# Patient Record
Sex: MALE | Race: OTHER
[De-identification: names, ages, dates, MRNs, and addresses within clinical notes are randomized per-mention and may not be internally consistent; named-entity substitution may affect disease eponyms.]

---

## 2021-11-24 ENCOUNTER — HOSPITAL ENCOUNTER (EMERGENCY)
Dept: HOSPITAL 61 - ER | Age: 31
Discharge: HOME | End: 2021-11-24
Payer: SELF-PAY

## 2021-11-24 VITALS — DIASTOLIC BLOOD PRESSURE: 70 MMHG | SYSTOLIC BLOOD PRESSURE: 128 MMHG

## 2021-11-24 VITALS — BODY MASS INDEX: 28.69 KG/M2 | HEIGHT: 70 IN | WEIGHT: 200.4 LBS

## 2021-11-24 DIAGNOSIS — S81.811A: ICD-10-CM
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
## 2021-11-24 DIAGNOSIS — S01.01XA: ICD-10-CM

## 2021-11-24 DIAGNOSIS — S22.010A: Primary | ICD-10-CM
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
## 2021-11-24 DIAGNOSIS — R51.9: ICD-10-CM

## 2021-11-24 DIAGNOSIS — M54.2: ICD-10-CM

## 2021-11-24 LAB
ALBUMIN SERPL-MCNC: 4.1 G/DL (ref 3.4–5)
ALBUMIN/GLOB SERPL: 1.3 {RATIO} (ref 1–1.7)
ALP SERPL-CCNC: 106 U/L (ref 46–116)
ALT SERPL-CCNC: 58 U/L (ref 16–63)
ANION GAP SERPL CALC-SCNC: 8 MMOL/L (ref 6–14)
APTT BLD: 28 SEC (ref 24–38)
AST SERPL-CCNC: 32 U/L (ref 15–37)
BASOPHILS # BLD AUTO: 0.1 X10^3/UL (ref 0–0.2)
BASOPHILS NFR BLD: 1 % (ref 0–3)
BILIRUB SERPL-MCNC: 0.4 MG/DL (ref 0.2–1)
BUN SERPL-MCNC: 21 MG/DL (ref 8–26)
BUN/CREAT SERPL: 21 (ref 6–20)
CALCIUM SERPL-MCNC: 8.3 MG/DL (ref 8.5–10.1)
CHLORIDE SERPL-SCNC: 103 MMOL/L (ref 98–107)
CO2 SERPL-SCNC: 26 MMOL/L (ref 21–32)
CREAT SERPL-MCNC: 1 MG/DL (ref 0.7–1.3)
EOSINOPHIL NFR BLD: 0.2 X10^3/UL (ref 0–0.7)
EOSINOPHIL NFR BLD: 2 % (ref 0–3)
ERYTHROCYTE [DISTWIDTH] IN BLOOD BY AUTOMATED COUNT: 13.3 % (ref 11.5–14.5)
GFR SERPLBLD BASED ON 1.73 SQ M-ARVRAT: 86.1 ML/MIN
GLUCOSE SERPL-MCNC: 108 MG/DL (ref 70–99)
HCT VFR BLD CALC: 43.4 % (ref 39–53)
HGB BLD-MCNC: 14.6 G/DL (ref 13–17.5)
LYMPHOCYTES # BLD: 3.5 X10^3/UL (ref 1–4.8)
LYMPHOCYTES NFR BLD AUTO: 41 % (ref 24–48)
MCH RBC QN AUTO: 31 PG (ref 25–35)
MCHC RBC AUTO-ENTMCNC: 34 G/DL (ref 31–37)
MCV RBC AUTO: 91 FL (ref 79–100)
MONO #: 0.7 X10^3/UL (ref 0–1.1)
MONOCYTES NFR BLD: 8 % (ref 0–9)
NEUT #: 4.2 X10^3/UL (ref 1.8–7.7)
NEUTROPHILS NFR BLD AUTO: 49 % (ref 31–73)
PLATELET # BLD AUTO: 279 X10^3/UL (ref 140–400)
POTASSIUM SERPL-SCNC: 3.8 MMOL/L (ref 3.5–5.1)
PROT SERPL-MCNC: 7.3 G/DL (ref 6.4–8.2)
PROTHROMBIN TIME: 12.9 SEC (ref 11.7–14)
RBC # BLD AUTO: 4.78 X10^6/UL (ref 4.3–5.7)
SODIUM SERPL-SCNC: 137 MMOL/L (ref 136–145)
WBC # BLD AUTO: 8.6 X10^3/UL (ref 4–11)

## 2021-11-24 PROCEDURE — 80053 COMPREHEN METABOLIC PANEL: CPT

## 2021-11-24 PROCEDURE — 12004 RPR S/N/AX/GEN/TRK7.6-12.5CM: CPT

## 2021-11-24 PROCEDURE — 90471 IMMUNIZATION ADMIN: CPT

## 2021-11-24 PROCEDURE — 36415 COLL VENOUS BLD VENIPUNCTURE: CPT

## 2021-11-24 PROCEDURE — 73562 X-RAY EXAM OF KNEE 3: CPT

## 2021-11-24 PROCEDURE — 85730 THROMBOPLASTIN TIME PARTIAL: CPT

## 2021-11-24 PROCEDURE — 99285 EMERGENCY DEPT VISIT HI MDM: CPT

## 2021-11-24 PROCEDURE — 85610 PROTHROMBIN TIME: CPT

## 2021-11-24 PROCEDURE — 90715 TDAP VACCINE 7 YRS/> IM: CPT

## 2021-11-24 PROCEDURE — 72125 CT NECK SPINE W/O DYE: CPT

## 2021-11-24 PROCEDURE — 72170 X-RAY EXAM OF PELVIS: CPT

## 2021-11-24 PROCEDURE — 71045 X-RAY EXAM CHEST 1 VIEW: CPT

## 2021-11-24 PROCEDURE — 70450 CT HEAD/BRAIN W/O DYE: CPT

## 2021-11-24 PROCEDURE — 85025 COMPLETE CBC W/AUTO DIFF WBC: CPT

## 2021-11-24 NOTE — RAD
EXAM: PELVIS 1 VIEW.



HISTORY: Fall from roof.



COMPARISON: None.



FINDINGS: No fractures are identified. The joint spaces and alignment of both hips are maintained.



IMPRESSION: 

1. No fracture.



Electronically signed by: MOISES Aguilera MD (11/24/2021 12:01 PM) [id number]

## 2021-11-24 NOTE — PHYS DOC
Past Medical History


Past Surgical History:  Other


Additional Past Surgical Histo:  RIGHT HAND


Smoking Status:  Never Smoker


Alcohol Use:  Occasionally





General Adult


EDM:


Chief Complaint:  HEAD INJURY/TRAUMA





HPI:


HPI:


32 yo M who denies any past medical history, presents to the ED with complaints 

of "I lost my balance" and fell off a roof (while repairing a roof) that was 

approximately 8 feet off the ground, landed on concrete, just prior to arrival. 

Pt states he knocked the right side of his head and rolled backwards off the 

roof-cannot recall specifically how he landed.  Has a laceration to his right 

leg and right side of his head.  Denies any associated alcohol or illicit drug 

use.  Does not take any prescribed medications, is not on any anticoagulants.  

No history of traumatic brain injury or intracranial hemorrhage.  Cannot recall 

his last tetanus.  Denies any associated dizziness, lightheadedness, neck pain, 

severe headache, blurry vision, decreased hearing, weakness or numbness, ataxia,

chest pain, dyspnea or loss of consciousness. Friend drove him to the hospital.





Review of Systems:


Review of Systems:


Constitutional:   Denies fever or chills. []


Eyes:   Denies change in visual acuity. []


HENT:   Denies nasal congestion or sore throat. [] 


Respiratory:   Denies cough or shortness of breath. [] 


Cardiovascular:   Denies chest pain or edema. [] 


GI:   Denies abdominal pain, nausea, vomiting, bloody stools or diarrhea. [] 


:  Denies incontinence or saddle anesthesia


Musculoskeletal:   Denies back pain or joint pain. [] 


Integument:   Denies rash or diaphoresis


Neurologic:   Denies headache, neck pain, focal weakness or sensory changes. [] 


Endocrine:   Denies polyuria or polydipsia. [] 


Lymphatic:  Denies swollen glands. [] 


Psychiatric:  Denies depression or anxiety. []





Heart Score:


C/O Chest Pain:  No


Risk Factors:


Risk Factors:  DM, Current or recent (<one month) smoker, HTN, HLP, family 

history of CAD, obesity.


Risk Scores:


Score 0 - 3:  2.5% MACE over next 6 weeks - Discharge Home


Score 4 - 6:  20.3% MACE over next 6 weeks - Admit for Clinical Observation


Score 7 - 10:  72.7% MACE over next 6 weeks - Early Invasive Strategies





Current Medications:





Current Medications








 Medications


  (Trade)  Dose


 Ordered  Sig/Jose De Jesus  Start Time


 Stop Time Status Last Admin


Dose Admin


 


 Diphtheria/


 Tetanus/Acell


 Pertussis


  (ADACEL TDap


 SYRINGE)  0.5 ml  ONCE ONCE  21 11:15


 21 11:16 DC  














Allergies:


Allergies:





Allergies








Coded Allergies Type Severity Reaction Last Updated Verified


 


  No Known Drug Allergies    21 No











Physical Exam:


PE:





Constitutional: Well developed, well nourished, no acute distress, non-toxic 

appearance. 


HENT: Right frontal scalp laceration, c-collar in place, no raccoon eyes, no 

jackson sign, no septal hematoma, no oral bleeding


Eyes: PERRLA, EOMI, conjunctiva normal, no discharge, no hemotympanum


Neck: Normal range of motion,  supple, 


Cardiovascular: S1/2 present, regular rhythm


Lungs & Thorax: Speaking in full sentences, bilateral equal chest rise, no 

tachypnea or increased work of breathing


Abdomen:  soft, no tenderness, 


Skin: Warm, dry, no erythema, no rash. [] 


Back: No midline spinal step-offs or tenderness-no thoracic ttp, no CVA 

tenderness. [] 


Extremities: Laceration over proximal right shin, no hip or ankle pain, equal 

DP/PT pulses, no cyanosis, no lower extremity edema


Neurologic: Alert and oriented X 3, normal motor function, normal sensory 

function, no focal deficits noted. []


Psychologic: Affect normal, judgement normal, mood normal. []





Current Patient Data:


Labs:





                                Laboratory Tests








Test


 21


10:39


 


White Blood Count


 8.6 x10^3/uL


(4.0-11.0)


 


Red Blood Count


 4.78 x10^6/uL


(4.30-5.70)


 


Hemoglobin


 14.6 g/dL


(13.0-17.5)


 


Hematocrit


 43.4 %


(39.0-53.0)


 


Mean Corpuscular Volume


 91 fL ()





 


Mean Corpuscular Hemoglobin 31 pg (25-35)  


 


Mean Corpuscular Hemoglobin


Concent 34 g/dL


(31-37)


 


Red Cell Distribution Width


 13.3 %


(11.5-14.5)


 


Platelet Count


 279 x10^3/uL


(140-400)


 


Neutrophils (%) (Auto) 49 % (31-73)  


 


Lymphocytes (%) (Auto) 41 % (24-48)  


 


Monocytes (%) (Auto) 8 % (0-9)  


 


Eosinophils (%) (Auto) 2 % (0-3)  


 


Basophils (%) (Auto) 1 % (0-3)  


 


Neutrophils # (Auto)


 4.2 x10^3/uL


(1.8-7.7)


 


Lymphocytes # (Auto)


 3.5 x10^3/uL


(1.0-4.8)


 


Monocytes # (Auto)


 0.7 x10^3/uL


(0.0-1.1)


 


Eosinophils # (Auto)


 0.2 x10^3/uL


(0.0-0.7)


 


Basophils # (Auto)


 0.1 x10^3/uL


(0.0-0.2)


 


Sodium Level


 137 mmol/L


(136-145)


 


Potassium Level


 3.8 mmol/L


(3.5-5.1)


 


Chloride Level


 103 mmol/L


()


 


Carbon Dioxide Level


 26 mmol/L


(21-32)


 


Anion Gap 8 (6-14)  


 


Blood Urea Nitrogen


 21 mg/dL


(8-26)


 


Creatinine


 1.0 mg/dL


(0.7-1.3)


 


Estimated GFR


(Cockcroft-Gault) 86.1  





 


BUN/Creatinine Ratio 21 (6-20)  H


 


Glucose Level


 108 mg/dL


(70-99)  H


 


Calcium Level


 8.3 mg/dL


(8.5-10.1)  L


 


Total Bilirubin


 0.4 mg/dL


(0.2-1.0)


 


Aspartate Amino Transferase


(AST) 32 U/L (15-37)





 


Alanine Aminotransferase (ALT)


 58 U/L (16-63)





 


Alkaline Phosphatase


 106 U/L


()


 


Total Protein


 7.3 g/dL


(6.4-8.2)


 


Albumin


 4.1 g/dL


(3.4-5.0)


 


Albumin/Globulin Ratio 1.3 (1.0-1.7)  


 


Ethyl Alcohol Level


 < 10 mg/dL


(0-10)





                                Laboratory Tests


21 10:39








                                Laboratory Tests


21 10:39








Vital Signs:





                                   Vital Signs








  Date Time  Temp Pulse Resp B/P (MAP) Pulse Ox O2 Delivery O2 Flow Rate FiO2


 


21 10:45 99.2 77 20 141/86 (104) 100 Room Air  





 99.2       











EKG:


EKG:


[]





Radiology/Procedures:


Radiology/Procedures:


                                 IMAGING REPORT





                                     Signed





PATIENT: ORTIZENRIQUEZ,ELDA AACCOUNT: [id number]     MRN#: [id number]


: 1990           LOCATION: ER              AGE: 31


SEX: M                    EXAM DT: 21         ACCESSION#: [id number]


STATUS: PRE ER            ORD. PHYSICIAN: JOANIE CUEVAS DO


REASON: fall from roof, headache


PROCEDURE: CT HEAD AND CERVICAL SPINE WO





EXAM: Head and cervical spine CT without contrast.





HISTORY: Fall. Headache.





TECHNIQUE: Computed tomographic images of the head and cervical spine were 

obtained without contrast.





*One or more of the following individualized dose reduction techniques were 

utilized for this examination:  


1. Automated exposure control.  


2. Adjustment of the mA and/or kV according to patient size.  


3. Use of iterative reconstruction technique.





COMPARISON: None.





FINDINGS: 





Head: There is no intracranial hemorrhage. There is no mass effect or midline 

shift. There is no hydrocephalus. The gray-white matter differentiation pattern 

is intact. There are punctate basal ganglia calcifications.





There is ethmoid sinus mucosal thickening. The orbits and mastoid air cells are 

unremarkable. There is no suspicious calvarial lesion. There aren't foci of soft

 tissue gas along the superior right scalp likely due to a laceration. No 

foreign body is seen.





Cervical spine: There is mild cervical curvature due to thoracic scoliosis. 

There are mild anterior wedge compression deformities at T1 and T2. The absence 

of a fracture line. There is a chronic etiology. There is also slight chronic 

decreased vertebral body height at C7. There is no listhesis. There is no 

suspicious osseous lesion. There is mild endplate remodeling at multiple levels.

 There is mild right foraminal stenosis at C3-C4, C4-C5 and C5-C6. The lung 

apices are unremarkable.





IMPRESSION:


1. No acute intracranial finding. There are foci of gas likely due to a 

laceration within the right parietal scalp. No foreign body is seen.


2. Mild anterior wedge compression deformities at T1 and T2 and minimal anterior

 wedging of C7. The absence of a fracture line at these levels favors a chronic 

etiology.


3. Mild degenerative change involving the cervical spine, resulting in mild 

right foraminal stenosis at multiple levels.





Electronically signed by: Monie Carlos MD (2021 11:28 AM) [id number]














DICTATED and SIGNED BY:     MONIE CARLOS MD


DATE:     21 [id number] 0


                                        


                                 IMAGING REPORT





                                     Signed





PATIENT: ELDA SHRESTHA AACCOUNT: [id number]     MRN#: [id number]


: 1990           LOCATION: ER              AGE: 31


SEX: M                    EXAM DT: 21         ACCESSION#: [id number]


STATUS: PRE ER            ORD. PHYSICIAN: JOANIE CUEVAS DO


REASON: right knee


PROCEDURE: KNEE RIGHT 3V





EXAM: Right knee, 3 views





HISTORY: Right knee pain.





COMPARISON: None.





FINDINGS: 





No fractures are identified. Joint spaces are maintained. Alignment is normal. 

There is no joint effusion.





IMPRESSION:


1. No fracture or joint effusion.





Electronically signed by: MOISES Aguilera MD (2021 11:40 AM) [id number]














DICTATED and SIGNED BY:     TONIA AGUILERA MD


DATE:     21 [id number] 0


                                 IMAGING REPORT





                                     Signed





PATIENT: ELDA SHRESTHA AACCOUNT: [id number]     MRN#: [id number]


: 1990           LOCATION: ER              AGE: 31


SEX: M                    EXAM DT: 21         ACCESSION#: [id number]


STATUS: PRE ER            ORD. PHYSICIAN: JOANIE CUEVAS DO


REASON: fall off roof


PROCEDURE: CHEST AP ONLY





EXAM: CHEST ONE VIEW.





HISTORY: Fall from roof.





COMPARISON: None.





FINDINGS: A frontal view of the chest is obtained.





There are no confluent infiltrates. There is no pneumothorax or pleural 

effusion. The heart is not enlarged.





IMPRESSION: 


1. No confluent infiltrates.





Electronically signed by: MOISES Aguilera MD (2021 12:01 PM) [id number]














DICTATED and SIGNED BY:     TONIA AGUILERA MD


DATE:     21 [id number] 0


                                 IMAGING REPORT





                                     Signed





PATIENT: ELDA SHRESTHA AACCOUNT: [id number]     MRN#: [id number]


: 1990           LOCATION: ER              AGE: 31


SEX: M                    EXAM DT: 21         ACCESSION#: [id number]


STATUS: PRE ER            ORD. PHYSICIAN: JOANIE CUEVAS DO


REASON: fall off roof


PROCEDURE: PELVIS





EXAM: PELVIS 1 VIEW.





HISTORY: Fall from roof.





COMPARISON: None.





FINDINGS: No fractures are identified. The joint spaces and alignment of both 

hips are maintained.





IMPRESSION: 


1. No fracture.





Electronically signed by: MOISES Aguilera MD (2021 12:01 PM) [id number]














DICTATED and SIGNED BY:     TONIA AGUILERA MD


DATE:     21 [id number] 0








Indication: Medial right shin proximal laceration





Procedure: The patient was placed in the appropriate position and anesthesia 

around the wound with 1% lidocaine. The area was then copiously irrigated. The 

laceration was closed with 4-0 Prolene suture, total of 8 sutures. The wound 

area was then dressed with triple abx ointment and sterile dressing.  





Total repaired wound length: v-shaped laceration approximately 6 cms





Other Items: wound flap with skin of the flap dark/likely no blood supply-pt 

warned skin may slough off





The patient tolerated the procedure .





Complications: none.





Indication: right parietal skull laceration





Procedure: The patient was placed in the appropriate position. The area was then

 copiously irrigated. The laceration was closed with 6 staples. The wound area 

was then dressed with triple antibiotic ointment.  





Total repaired wound length: 6cm. 





Other Items: none





The patient tolerated the procedure .





Complications: none.





Course & Med Decision Making:


Course & Med Decision Making


Pertinent Labs and Imaging studies reviewed. (See chart for details)





Concern for blunt head injury after falling off a roof and sustaining a right 

parietal scalp laceration and right medial shin laceration.  Patient 

weightbearing with no neurological deficits.  CT images show asymptomatic 

(patient has no T1 or T2 tenderness), thoracic wedge compressions.  Patient's 

tetanus was updated.  Will discharge home with strict ED return precautions were

 given for neurologic deficits, confusion, altered mental status, severe 

headache, nausea or vomiting. Encouraged urgent outpatient follow-up with PMD 

and neurosurgery for compression fracture management.  Life-threatening 

processes were considered but are low suspicion at this time, given history, 

physical exam and ED workup. Pt was educated on all prescription medications and

 adverse effects.  All patient's questions were answered and pt was stable at 

time of discharge.





Life/limb-threatening differential includes but is not limited to, intracranial 

hemorrhage, diffuse axonal injury, spinal cord syndrome, unstable cervical 

fracture or SCIWORA, fractures or joint dislocations, neurovascular injuries, 

organ injury or laceration, pneumothorax, pneumoperitoneum, pericardial 

tamponade, unstable pelvic fracture, compartment syndrome, flail chest or 

respiratory distress, burn injury or asphyxiation





I have spoken with the patient and/or caregivers.  I explained the patient's 

condition, diagnoses and treatment plan based on the information available to me

 at this time.  I have answered the patient and/or caregiver's questions and 

addressed any concerns.  The patient and/or caregivers have a good understanding

 of patient's diagnosis, condition and treatment plan as can be expected at this

 point.  Vital signs have been stable.  Patient's condition is stable and 

appropriate for discharge from the emergency department. 





Patient will pursue further outpatient evaluation with primary care physician or

 other designated or consulting physician as outlined in the discharge 

instructions.  The patient and/or caregivers are agreeable to this plan of care 

and follow-up instructions have been explained in detail.  The patient and/or 

caregivers have received these instructions in written form and have expressed 

an understanding of the discharge instructions.  The patient and/or caregivers 

are aware that any significant change of condition or worsening of symptoms 

should prompt immediate return to this or the closest emergency department or 

call to 911.





Garett Disclaimer:


Dragon Disclaimer:


This electronic medical record was generated, in whole or in part, using a voice

 recognition dictation system.





Departure


Departure


Impression:  


   Primary Impression:  


   Laceration of scalp


   Additional Impressions:  


   Laceration of right lower leg without complication


   Fall from roof as cause of accidental injury


   Wedge compression fracture of thoracic vertebra


   Need for Tdap vaccination


Disposition:  01 HOME / SELF CARE / HOMELESS


Condition:  STABLE


Referrals:  


TERRY WOOD III DO


Follow-up with your primary care physician in 24 to 48 hours OR


FOLLOW UP WITH FAMILY MEDICINE:


[address]


[place] [address]


Phone: [phone]


Patient Instructions:  Back, Compression Fracture, Laceration Care, Adult, VIS, 

Tetanus, Diphtheria (Td); Tetanus, Diphtheria, Pertussis (Tdap) - [facility]





Additional Instructions:  


RETURN IN 7-10 DAYS FOR STAPLE AND SUTURE REMOVAL-return immediately if you 

should develop any confusion, difficulties walking, sensory loss, motor 

deficits, severe headache with nausea and vomiting or repeat head injury





FOLLOW UP WITH  NEUROSURGERY:  FOR DEFINITIVE MANAGEMENT of asymptomatic, 

thoracic wedge compression fracture


Neurological Surgery


[facility]


[address]


[place] [address]


Phone: [phone]





EMERGENCY DEPARTMENT GENERAL DISCHARGE INSTRUCTIONS





Thank you for coming to [facility] Emergency Department (ED) 

today and 


trusting us with you care.  We trust that you had a positive experience in our 

Emergency 


Department.  If you wish to speak to the department management, you may call the

 Director at 


[phone].





YOUR FOLLOW UP INSTRUCTIONS ARE AS FOLLOWS:





1.  Do you have a private Doctor?  If you do not have a private doctor, please 

ask for a 


resource list of physicians or clinics that may be able to assist you with 

follow up care.





2.  The Emergency Physicain has interpreted your x-rays.  The X-Ray specialist 

will also 


review them.  If there is a change in the findings, you will be notified in 48 

hours when at 


all possible.





3.  A lab test or culture has been done, your results will be reviewed and you 

will be 


notified if you need a change in treatment.





ADDITIONAL INSTRUCTIONS AND INFORMATION:





1.  Your care today has been supervised by a physician who is specially trained 

in emergency 


care.  Many problems require more than one evaluation for a complete diagnosis 

and 


treatment.  We recommend that you schedule your follow up appointment as 

recommended to 


ensure complete treatment of you illness or injury.  If you are unable to obtain

 follow up 


care and continue to have a problem, or if your condition worsens, we recommend 

that you 


return to the ED.





2.  We are not able to safely determine your condition over the phone nor are we

 able to 


give sound medical advice over the phone.  For these safety reasons, if you call

 for medical 


advice we will ask you to come to the ED for further evaluation.





3.  If you have any questions regarding these discharge instructions please call

 the ED at 


[phone].





SAFETY INFORMATION:





In the interest of safety, wellness, and injury prevention; we encourage you to 

wear your 


sealbelt, if you smoke; quite smoking, and we encourage family to use a 

protective helmet 


for bicycling and other sporting events that present an increased risk for head 

injury.





IF YOUR SYMPTOMS WORSEN OR NEW SYMPTOMS DEVELOP, OR YOU HAVE CONCERNS ABOUT YOUR

 CONDITION; 


OR IF YOUR CONDITION WORSENS WHILE YOU ARE WAITING FOR YOUR FOLLOW UP 

APPOINTMENT; EITHER 


CONTACT YOUR PRIMARY CARE DOCTOR, THE PHYSICIAN WHOSE NAME AND NUMBER YOU WERE 

GIVEN, OR 


RETURN TO THE ED IMMEDIATELY.











JOANIE CUEVAS DO               2021 11:47

## 2021-11-24 NOTE — RAD
EXAM: CHEST ONE VIEW.



HISTORY: Fall from roof.



COMPARISON: None.



FINDINGS: A frontal view of the chest is obtained.



There are no confluent infiltrates. There is no pneumothorax or pleural effusion. The heart is not en
larged.



IMPRESSION: 

1. No confluent infiltrates.



Electronically signed by: MOISES Aguilera MD (11/24/2021 12:01 PM) [id number]

## 2021-11-24 NOTE — RAD
EXAM: Head and cervical spine CT without contrast.



HISTORY: Fall. Headache.



TECHNIQUE: Computed tomographic images of the head and cervical spine were obtained without contrast.




*One or more of the following individualized dose reduction techniques were utilized for this examina
tion:  

1. Automated exposure control.  

2. Adjustment of the mA and/or kV according to patient size.  

3. Use of iterative reconstruction technique.



COMPARISON: None.



FINDINGS: 



Head: There is no intracranial hemorrhage. There is no mass effect or midline shift. There is no hydr
ocephalus. The gray-white matter differentiation pattern is intact. There are punctate basal ganglia 
calcifications.



There is ethmoid sinus mucosal thickening. The orbits and mastoid air cells are unremarkable. There i
s no suspicious calvarial lesion. There aren't foci of soft tissue gas along the superior right scalp
 likely due to a laceration. No foreign body is seen.



Cervical spine: There is mild cervical curvature due to thoracic scoliosis. There are mild anterior w
edge compression deformities at T1 and T2. The absence of a fracture line. There is a chronic etiolog
y. There is also slight chronic decreased vertebral body height at C7. There is no listhesis. There i
s no suspicious osseous lesion. There is mild endplate remodeling at multiple levels. There is mild r
ight foraminal stenosis at C3-C4, C4-C5 and C5-C6. The lung apices are unremarkable.



IMPRESSION:

1. No acute intracranial finding. There are foci of gas likely due to a laceration within the right p
arietal scalp. No foreign body is seen.

2. Mild anterior wedge compression deformities at T1 and T2 and minimal anterior wedging of C7. The a
bsence of a fracture line at these levels favors a chronic etiology.

3. Mild degenerative change involving the cervical spine, resulting in mild right foraminal stenosis 
at multiple levels.



Electronically signed by: Monie Huff MD (11/24/2021 11:28 AM) [id number]

## 2021-11-24 NOTE — RAD
EXAM: Right knee, 3 views



HISTORY: Right knee pain.



COMPARISON: None.



FINDINGS: 



No fractures are identified. Joint spaces are maintained. Alignment is normal. There is no joint effu
mony.



IMPRESSION:

1. No fracture or joint effusion.



Electronically signed by: MOISES Aguilera MD (11/24/2021 11:40 AM) [id number]